# Patient Record
Sex: MALE | Race: WHITE | HISPANIC OR LATINO | Employment: FULL TIME | URBAN - METROPOLITAN AREA
[De-identification: names, ages, dates, MRNs, and addresses within clinical notes are randomized per-mention and may not be internally consistent; named-entity substitution may affect disease eponyms.]

---

## 2018-11-15 DIAGNOSIS — R01.0 BENIGN AND INNOCENT CARDIAC MURMURS: ICD-10-CM

## 2018-11-15 DIAGNOSIS — E04.8 OTHER SPECIFIED NONTOXIC GOITER: Primary | ICD-10-CM

## 2019-03-11 ENCOUNTER — HOSPITAL ENCOUNTER (EMERGENCY)
Facility: HOSPITAL | Age: 19
Discharge: HOME | End: 2019-03-11
Attending: EMERGENCY MEDICINE
Payer: COMMERCIAL

## 2019-03-11 VITALS
HEART RATE: 75 BPM | RESPIRATION RATE: 16 BRPM | DIASTOLIC BLOOD PRESSURE: 69 MMHG | OXYGEN SATURATION: 99 % | TEMPERATURE: 98 F | HEIGHT: 69 IN | SYSTOLIC BLOOD PRESSURE: 123 MMHG | BODY MASS INDEX: 32.58 KG/M2 | WEIGHT: 220 LBS

## 2019-03-11 DIAGNOSIS — R59.1 LYMPHADENOPATHY: ICD-10-CM

## 2019-03-11 DIAGNOSIS — R52 BODY ACHES: Primary | ICD-10-CM

## 2019-03-11 DIAGNOSIS — R20.2 PARESTHESIAS: ICD-10-CM

## 2019-03-11 DIAGNOSIS — M79.10 MYALGIA: ICD-10-CM

## 2019-03-11 LAB
ALBUMIN SERPL BCP-MCNC: 4.6 G/DL
ALP SERPL-CCNC: 59 U/L
ALT SERPL W/O P-5'-P-CCNC: 31 U/L
ANION GAP SERPL CALC-SCNC: 9 MMOL/L
AST SERPL-CCNC: 24 U/L
BASOPHILS # BLD AUTO: 0.04 K/UL
BASOPHILS NFR BLD: 0.5 %
BILIRUB SERPL-MCNC: 0.8 MG/DL
BILIRUB UR QL STRIP: NEGATIVE
BUN SERPL-MCNC: 11 MG/DL
CALCIUM SERPL-MCNC: 9.9 MG/DL
CHLORIDE SERPL-SCNC: 102 MMOL/L
CLARITY UR REFRACT.AUTO: CLEAR
CO2 SERPL-SCNC: 26 MMOL/L
COLOR UR AUTO: NORMAL
CREAT SERPL-MCNC: 0.8 MG/DL
DIFFERENTIAL METHOD: NORMAL
EOSINOPHIL # BLD AUTO: 0.1 K/UL
EOSINOPHIL NFR BLD: 1 %
ERYTHROCYTE [DISTWIDTH] IN BLOOD BY AUTOMATED COUNT: 12.3 %
EST. GFR  (AFRICAN AMERICAN): >60 ML/MIN/1.73 M^2
EST. GFR  (NON AFRICAN AMERICAN): >60 ML/MIN/1.73 M^2
GLUCOSE SERPL-MCNC: 94 MG/DL
GLUCOSE UR QL STRIP: NEGATIVE
HCT VFR BLD AUTO: 45.6 %
HETEROPH AB SERPL QL IA: NEGATIVE
HGB BLD-MCNC: 15.3 G/DL
HGB UR QL STRIP: NEGATIVE
IMM GRANULOCYTES # BLD AUTO: 0.02 K/UL
IMM GRANULOCYTES NFR BLD AUTO: 0.2 %
KETONES UR QL STRIP: NEGATIVE
LEUKOCYTE ESTERASE UR QL STRIP: NEGATIVE
LYMPHOCYTES # BLD AUTO: 2.8 K/UL
LYMPHOCYTES NFR BLD: 32.1 %
MCH RBC QN AUTO: 29.1 PG
MCHC RBC AUTO-ENTMCNC: 33.6 G/DL
MCV RBC AUTO: 87 FL
MONOCYTES # BLD AUTO: 0.5 K/UL
MONOCYTES NFR BLD: 6.1 %
NEUTROPHILS # BLD AUTO: 5.2 K/UL
NEUTROPHILS NFR BLD: 60.1 %
NITRITE UR QL STRIP: NEGATIVE
NRBC BLD-RTO: 0 /100 WBC
PH UR STRIP: 7 [PH] (ref 5–8)
PLATELET # BLD AUTO: 257 K/UL
PMV BLD AUTO: 10.7 FL
POCT GLUCOSE: 92 MG/DL (ref 70–110)
POTASSIUM SERPL-SCNC: 3.7 MMOL/L
PROT SERPL-MCNC: 7.7 G/DL
PROT UR QL STRIP: NEGATIVE
RBC # BLD AUTO: 5.25 M/UL
SODIUM SERPL-SCNC: 137 MMOL/L
SP GR UR STRIP: 1.01 (ref 1–1.03)
URN SPEC COLLECT METH UR: NORMAL
WBC # BLD AUTO: 8.69 K/UL

## 2019-03-11 PROCEDURE — 99284 EMERGENCY DEPT VISIT MOD MDM: CPT | Mod: ,,, | Performed by: PHYSICIAN ASSISTANT

## 2019-03-11 PROCEDURE — 86308 HETEROPHILE ANTIBODY SCREEN: CPT

## 2019-03-11 PROCEDURE — 80053 COMPREHEN METABOLIC PANEL: CPT

## 2019-03-11 PROCEDURE — 85025 COMPLETE CBC W/AUTO DIFF WBC: CPT

## 2019-03-11 PROCEDURE — 99284 PR EMERGENCY DEPT VISIT,LEVEL IV: ICD-10-PCS | Mod: ,,, | Performed by: PHYSICIAN ASSISTANT

## 2019-03-11 PROCEDURE — 87491 CHLMYD TRACH DNA AMP PROBE: CPT

## 2019-03-11 PROCEDURE — 81003 URINALYSIS AUTO W/O SCOPE: CPT

## 2019-03-11 PROCEDURE — 82962 GLUCOSE BLOOD TEST: CPT

## 2019-03-11 PROCEDURE — 99283 EMERGENCY DEPT VISIT LOW MDM: CPT

## 2019-03-12 LAB
C TRACH DNA SPEC QL NAA+PROBE: NOT DETECTED
N GONORRHOEA DNA SPEC QL NAA+PROBE: NOT DETECTED

## 2019-03-12 NOTE — ED TRIAGE NOTES
Patient reports to ED today with report of tingling and upper and lower extremities. Onset 2 days ago.

## 2019-03-12 NOTE — ED PROVIDER NOTES
"Encounter Date: 3/11/2019       History     Chief Complaint   Patient presents with    Generalized Body Aches     C/o intermittent generalized body "pulsing" x 2-3 days.      Patient is a 18-year-old male with no significant medical history who presents to the emergency department with tingling sensations and body aches.  Patient states he has been very sick over the last 2 months.  He reports persistent cold-like symptoms.  He reports sore throat that has been persistent over the last 2 months.  He states he has been to urgent care twice and told he did not have strep throat.  He states he was given 2 steroid shots.  He states for a couple of days he will feel good, but then he goes back to not feeling well. He reports sore throat currently.  He reports intermittent fevers.  He reports body aches.  He reports tingling in his veins.  He denies nausea or vomiting. He denies decrease in appetite.  He denies headache or neck stiffness.  He denies visual disturbance.  He reports normal bowel movements with no blood in stool.  He denies urinary symptoms.  He states he has been having generalized weakness and fatigue.  He also reports swelling behind his right ear.        The history is provided by the patient.     Review of patient's allergies indicates:  No Known Allergies  History reviewed. No pertinent past medical history.  History reviewed. No pertinent surgical history.  History reviewed. No pertinent family history.  Social History     Tobacco Use    Smoking status: Current Every Day Smoker    Smokeless tobacco: Never Used   Substance Use Topics    Alcohol use: No     Frequency: Never    Drug use: No     Review of Systems   Constitutional: Positive for activity change, fatigue and fever. Negative for appetite change and chills.   HENT: Positive for congestion and sore throat. Negative for ear discharge, ear pain, rhinorrhea, trouble swallowing and voice change.    Eyes: Negative for photophobia and visual " disturbance.   Respiratory: Negative for cough and shortness of breath.    Cardiovascular: Negative for chest pain.   Gastrointestinal: Negative for abdominal pain, blood in stool, constipation, diarrhea, nausea and vomiting.   Genitourinary: Negative for dysuria, flank pain and hematuria.   Musculoskeletal: Positive for myalgias. Negative for back pain, neck pain and neck stiffness.   Skin: Negative for rash and wound.   Neurological: Positive for weakness (generalized). Negative for dizziness, syncope, light-headedness, numbness and headaches.       Physical Exam     Initial Vitals [03/11/19 1910]   BP Pulse Resp Temp SpO2   123/69 75 16 98.1 °F (36.7 °C) 99 %      MAP       --         Physical Exam    Nursing note and vitals reviewed.  Constitutional: Vital signs are normal. He appears well-developed and well-nourished. He is not diaphoretic.  Non-toxic appearance. No distress.   HENT:   Head: Normocephalic.   Right Ear: Hearing, tympanic membrane, external ear and ear canal normal.   Left Ear: Hearing, tympanic membrane, external ear and ear canal normal.   Nose: Mucosal edema present.   Mouth/Throat: Uvula is midline, oropharynx is clear and moist and mucous membranes are normal. No trismus in the jaw. No uvula swelling. No oropharyngeal exudate.   Posterior oropharyngeal cobblestoning   Eyes: Conjunctivae and EOM are normal. Pupils are equal, round, and reactive to light.   Neck: Normal range of motion and full passive range of motion without pain. Neck supple. Normal range of motion present. No neck rigidity.   Cardiovascular: Normal rate, regular rhythm and normal heart sounds. Exam reveals no gallop and no friction rub.    No murmur heard.  No lower extremity edema   Pulmonary/Chest: Breath sounds normal. No respiratory distress. He has no wheezes. He has no rhonchi. He has no rales. He exhibits no tenderness.   Abdominal: Soft. Bowel sounds are normal. There is no tenderness.   Lymphadenopathy:        Head  (right side): Posterior auricular adenopathy present.     He has no cervical adenopathy.   Neurological: He is alert and oriented to person, place, and time. He has normal strength. No cranial nerve deficit or sensory deficit. He displays a negative Romberg sign. Coordination and gait normal. GCS score is 15. GCS eye subscore is 4. GCS verbal subscore is 5. GCS motor subscore is 6.   Normal finger to nose.  Normal rapid hand movements.  Normal heel to shin.  Normal gait.   Skin: Skin is warm, dry and intact. Capillary refill takes less than 2 seconds. No bruising, no ecchymosis and no lesion noted. No cyanosis or erythema. No pallor.   No rashes or wounds.   Psychiatric: He has a normal mood and affect.         ED Course   Procedures  Labs Reviewed   C. TRACHOMATIS/N. GONORRHOEAE BY AMP DNA   CBC W/ AUTO DIFFERENTIAL   COMPREHENSIVE METABOLIC PANEL   URINALYSIS, REFLEX TO URINE CULTURE    Narrative:     Preferred Collection Type->Urine, Clean Catch   HETEROPHILE AB SCREEN   POCT GLUCOSE   POCT GLUCOSE MONITORING CONTINUOUS          Imaging Results    None          Medical Decision Making:   Initial Assessment:   Urgent evaluation of a 18-year-old male with no significant medical history who presents to the emergency department with generalized body aches and tingling sensations.  Patient is afebrile, nontoxic appearing, and hemodynamically stable. Patient is very well appearing exam.  Vital signs are pristine.  No nuchal rigidity.  Unremarkable extensive neuro exam.  Benign abdominal exam.  No lower extremity edema. Pink conjunctiva.  Moist mucous membranes.  There is posterior oropharyngeal cobblestoning with some nasal mucosal edema.  Uvula is midline.  No exudate. No evidence of strep pharyngitis.  Patient's mother accompanies him.  She states she is very worried that he has been so sick over the last 2 months.  I will obtain basic labs to evaluate white count and H&H.  Will evaluate electrolytes and kidney  function. They are requesting STD testing, so I will add this on to his urine.  Explained that if they want further HIV, syphilis, and other blood work they would need to follow up with PCP.  With patient's persistent sore throat, I will also check a Monospot.  Will check glucose.  Explained to patient and mother they will need to follow up with PCP for further vitamin deficiency testing.  Also explained if symptoms persist they should follow up with Neurology.  On exam, patient does have right-sided postauricular lymphadenopathy.  Advised to follow up with PCP about this if persistent for possible outpatient biopsy.  Clinical Tests:   Lab Tests: Ordered and Reviewed  ED Management:  8:48 PM  Lab work is unremarkable.  No anemia.  No leukocytosis.  No neutropenia.  No kidney insufficiency.  No electrolyte disturbance.  Urinalysis is unremarkable.  Glucose is normal. Monospot is negative. I do not feel that further emergent workup is warranted.  Patient is advised to follow up with PCP for further outpatient workup.  Patient is advised to return to the emergency department with any worsening symptoms or concerns.                      Clinical Impression:       ICD-10-CM ICD-9-CM   1. Body aches R52 780.96   2. Paresthesias R20.2 782.0   3. Lymphadenopathy R59.1 785.6   4. Myalgia M79.10 729.1                                Masha Johnson PA-C  03/11/19 2050

## 2019-06-24 ENCOUNTER — HOSPITAL ENCOUNTER (OUTPATIENT)
Dept: RADIOLOGY | Facility: HOSPITAL | Age: 19
Discharge: HOME | End: 2019-06-24
Attending: PEDIATRICS
Payer: COMMERCIAL

## 2019-06-24 DIAGNOSIS — R05.9 COUGH: ICD-10-CM

## 2019-06-24 DIAGNOSIS — R05.9 COUGH: Primary | ICD-10-CM

## 2019-06-24 PROCEDURE — 71046 X-RAY EXAM CHEST 2 VIEWS: CPT | Mod: TC,FY

## 2019-06-24 PROCEDURE — 71046 X-RAY EXAM CHEST 2 VIEWS: CPT | Mod: 26,,, | Performed by: RADIOLOGY

## 2019-06-24 PROCEDURE — 71046 XR CHEST PA AND LATERAL: ICD-10-PCS | Mod: 26,,, | Performed by: RADIOLOGY

## 2019-06-26 ENCOUNTER — HOSPITAL ENCOUNTER (OUTPATIENT)
Dept: RADIOLOGY | Facility: HOSPITAL | Age: 19
Discharge: HOME | End: 2019-06-26
Attending: PEDIATRICS
Payer: COMMERCIAL

## 2019-06-26 DIAGNOSIS — M54.50 LOW BACKACHE: Primary | ICD-10-CM

## 2019-06-26 DIAGNOSIS — M54.50 LOW BACKACHE: ICD-10-CM

## 2019-06-26 PROCEDURE — 72080 X-RAY EXAM THORACOLMB 2/> VW: CPT | Mod: TC

## 2019-06-26 PROCEDURE — 72080 X-RAY EXAM THORACOLMB 2/> VW: CPT | Mod: 26,,, | Performed by: RADIOLOGY

## 2019-06-26 PROCEDURE — 72080 XR THORACOLUMBAR SPINE AP LATERAL: ICD-10-PCS | Mod: 26,,, | Performed by: RADIOLOGY

## 2020-02-25 ENCOUNTER — HOSPITAL ENCOUNTER (EMERGENCY)
Facility: HOSPITAL | Age: 20
Discharge: HOME | End: 2020-02-25
Attending: EMERGENCY MEDICINE
Payer: COMMERCIAL

## 2020-02-25 VITALS
OXYGEN SATURATION: 97 % | HEART RATE: 99 BPM | SYSTOLIC BLOOD PRESSURE: 125 MMHG | DIASTOLIC BLOOD PRESSURE: 72 MMHG | TEMPERATURE: 99 F | RESPIRATION RATE: 19 BRPM | BODY MASS INDEX: 29.35 KG/M2 | HEIGHT: 70 IN | WEIGHT: 205 LBS

## 2020-02-25 DIAGNOSIS — S61.011A LACERATION OF RIGHT THUMB WITHOUT FOREIGN BODY WITHOUT DAMAGE TO NAIL, INITIAL ENCOUNTER: Primary | ICD-10-CM

## 2020-02-25 PROCEDURE — 90471 IMMUNIZATION ADMIN: CPT | Performed by: PHYSICIAN ASSISTANT

## 2020-02-25 PROCEDURE — 25000003 PHARM REV CODE 250: Performed by: PHYSICIAN ASSISTANT

## 2020-02-25 PROCEDURE — 99284 EMERGENCY DEPT VISIT MOD MDM: CPT | Mod: 25

## 2020-02-25 PROCEDURE — 12001 RPR S/N/AX/GEN/TRNK 2.5CM/<: CPT | Mod: RT

## 2020-02-25 PROCEDURE — 63600175 PHARM REV CODE 636 W HCPCS: Performed by: PHYSICIAN ASSISTANT

## 2020-02-25 PROCEDURE — 90715 TDAP VACCINE 7 YRS/> IM: CPT | Performed by: PHYSICIAN ASSISTANT

## 2020-02-25 RX ORDER — LIDOCAINE HYDROCHLORIDE 10 MG/ML
10 INJECTION INFILTRATION; PERINEURAL
Status: COMPLETED | OUTPATIENT
Start: 2020-02-25 | End: 2020-02-25

## 2020-02-25 RX ADMIN — NEOMYCIN-BACITRACIN-POLYMYXIN OINT 1 EACH: OINTMENT at 07:02

## 2020-02-25 RX ADMIN — CLOSTRIDIUM TETANI TOXOID ANTIGEN (FORMALDEHYDE INACTIVATED), CORYNEBACTERIUM DIPHTHERIAE TOXOID ANTIGEN (FORMALDEHYDE INACTIVATED), BORDETELLA PERTUSSIS TOXOID ANTIGEN (GLUTARALDEHYDE INACTIVATED), BORDETELLA PERTUSSIS FILAMENTOUS HEMAGGLUTININ ANTIGEN (FORMALDEHYDE INACTIVATED), BORDETELLA PERTUSSIS PERTACTIN ANTIGEN, AND BORDETELLA PERTUSSIS FIMBRIAE 2/3 ANTIGEN 0.5 ML: 5; 2; 2.5; 5; 3; 5 INJECTION, SUSPENSION INTRAMUSCULAR at 07:02

## 2020-02-25 RX ADMIN — LIDOCAINE HYDROCHLORIDE 10 ML: 10 INJECTION, SOLUTION INFILTRATION; PERINEURAL at 07:02

## 2020-02-25 NOTE — ED NOTES
Patient reports drinking a 5th of whiskey and 1 case of beer himself.  Patient A&O x 4 with GCS 15.  Irrigation materials and laceration kit at bedside.

## 2020-02-25 NOTE — ED PROVIDER NOTES
Encounter Date: 2/25/2020       History     Chief Complaint   Patient presents with    Laceration     pt reports that he tripped, fell, and cut his RIGHT hand on the glass beer bottle he was holding; pt has aprox 3cm long laceration to medial side of thumb of RIGHT hand (not actively bleeding); pt's Tetanus UTD     Chief Complaint:  Laceration  History of  Present Illness: History obtained from patient. This 19 y.o. male who has no known past medical history presents to the ED complaining of lacerations to the right thumb after falling onto a beer bottle at 5:00 a.m. this morning.  Denies numbness, tingling, weakness. Patient is unsure of last tetanus vaccination.        Review of patient's allergies indicates:  No Known Allergies  History reviewed. No pertinent past medical history.  History reviewed. No pertinent surgical history.  History reviewed. No pertinent family history.  Social History     Tobacco Use    Smoking status: Current Every Day Smoker     Types: Cigarettes    Smokeless tobacco: Never Used   Substance Use Topics    Alcohol use: Yes     Frequency: Never     Comment: Occasionally     Drug use: No     Review of Systems   Constitutional: Negative for chills and fever.   HENT: Negative for congestion, rhinorrhea and sore throat.    Eyes: Negative for visual disturbance.   Respiratory: Negative for cough and shortness of breath.    Cardiovascular: Negative for chest pain.   Gastrointestinal: Negative for abdominal pain, diarrhea, nausea and vomiting.   Genitourinary: Negative for dysuria, frequency and hematuria.   Musculoskeletal: Negative for back pain.   Skin: Positive for wound. Negative for rash.   Neurological: Negative for dizziness, weakness and headaches.       Physical Exam     Initial Vitals [02/25/20 0534]   BP Pulse Resp Temp SpO2   125/72 99 19 99 °F (37.2 °C) 97 %      MAP       --         Physical Exam    Nursing note and vitals reviewed.  Constitutional: He appears well-developed  and well-nourished. No distress.   HENT:   Head: Normocephalic and atraumatic.   Right Ear: Tympanic membrane normal.   Left Ear: Tympanic membrane normal.   Nose: Nose normal.   Mouth/Throat: Uvula is midline, oropharynx is clear and moist and mucous membranes are normal.   Eyes: EOM are normal. Pupils are equal, round, and reactive to light.   Neck: Trachea normal, normal range of motion, full passive range of motion without pain and phonation normal. Neck supple. No stridor present. No spinous process tenderness and no muscular tenderness present. Normal range of motion present. No neck rigidity.   Cardiovascular: Normal rate, regular rhythm and normal heart sounds. Exam reveals no gallop and no friction rub.    No murmur heard.  Pulses:       Radial pulses are 2+ on the right side, and 2+ on the left side.   Pulmonary/Chest: Effort normal and breath sounds normal. No respiratory distress. He has no wheezes. He has no rhonchi. He has no rales.   Abdominal: Soft. Bowel sounds are normal. He exhibits no mass. There is no tenderness. There is no rebound and no guarding.   Musculoskeletal: Normal range of motion.   There is a 1.5 cm laceration to the medial aspect of the right thumb.  There is full range of motion of the right thumb against resistance.  No active bleeding.  No obvious tendon lacerations.  No visible foreign bodies.   Neurological: He is alert and oriented to person, place, and time. He has normal strength. No cranial nerve deficit or sensory deficit.   Skin: Skin is warm and dry. Capillary refill takes less than 2 seconds.   Psychiatric: He has a normal mood and affect.         ED Course   Lac Repair  Date/Time: 2/25/2020 8:30 AM  Performed by: Jonathan Esteves PA-C  Authorized by: Ronald Del Toro MD   Body area: upper extremity  Location details: right thumb  Laceration length: 1.5 cm  Foreign bodies: no foreign bodies  Tendon involvement: none  Nerve involvement: none  Vascular damage:  no  Anesthesia: digital block    Anesthesia:  Local Anesthetic: lidocaine 1% without epinephrine  Anesthetic total: 2 mL  Preparation: Patient was prepped and draped in the usual sterile fashion.  Irrigation solution: saline  Irrigation method: syringe  Amount of cleaning: extensive  Debridement: none  Degree of undermining: none  Skin closure: 5-0 nylon  Number of sutures: 6  Technique: simple  Approximation: close  Approximation difficulty: simple  Dressing: 4x4 sterile gauze and antibiotic ointment  Patient tolerance: Patient tolerated the procedure well with no immediate complications        Labs Reviewed - No data to display       Imaging Results          X-Ray Finger 2 or More Views Right (Final result)  Result time 02/25/20 07:38:09    Final result by Toy Mercer DO (02/25/20 07:38:09)                 Impression:      Please see above      Electronically signed by: Toy Mercer DO  Date:    02/25/2020  Time:    07:38             Narrative:    EXAMINATION:  XR FINGER 2 OR MORE VIEWS RIGHT    CLINICAL HISTORY:  laceration, evaluate for foreign body;    TECHNIQUE:  AP, lateral and oblique views 1st digit right hand    COMPARISON:  None    FINDINGS:  Question soft tissue swelling overlying the 1st proximal phalanx with heterogeneous lucency concerning for possible subcutaneous emphysema concerning for laceration in light of history.  There is no evidence for acute fracture line or dislocation first digit right hand.  No focal bony erosion.  Further evaluation as warranted clinically.                                 Medical Decision Making:   ED Management:  This is an evaluation of a 19 y.o. male that presents to the Emergency Department for a Laceration. Physical Exam shows a non-toxic, afebrile, and well appearing male. There is a laceration to medial aspect of the right thumb. There is no surrounding erythema or area of increased warmth. All compartments are soft. There is full ROM of the right thumb  against resistance. Equal sensation to the extremity. No visible tendon lacerations observed on exam.  The wound was irrigated and visually inspected prior to closure. No visible foreign bodies noted. Vital Signs Are Reassuring. Tetanus is not up to date.     RESULTS: Teteanus updated. The wound was closed per the procedure note.     My overall impression is Laceration. I considered, but at this time, do not suspect cellulitis, compartment syndrome, underlying fracture, tendon injury, or suspect any retained foreign body at this time.     Additional D/C Information: Laceration/Wound Care/Suture Removal instructions given. The diagnosis, treatment plan, instructions for follow-up and reevaluation with his PCP or Return to ED for suture removal as well as ED return precautions were discussed and understanding was verbalized. All questions or concerns have been addressed.                                    Clinical Impression:       ICD-10-CM ICD-9-CM   1. Laceration of right thumb without foreign body without damage to nail, initial encounter S61.011A 883.0                             Jonathan Esteves PA-C  02/25/20 0831

## 2020-02-25 NOTE — DISCHARGE INSTRUCTIONS
Return to the ER go to your primary care doctor in 10-14 days for suture removal  You may bathe with the sutures but do not scrub vigorously.  Change dressing daily  Return to the ER for new or worsening symptoms.

## 2020-07-16 ENCOUNTER — OFFICE VISIT (OUTPATIENT)
Dept: URGENT CARE | Facility: CLINIC | Age: 20
End: 2020-07-16
Payer: COMMERCIAL

## 2020-07-16 VITALS
HEART RATE: 68 BPM | TEMPERATURE: 98 F | SYSTOLIC BLOOD PRESSURE: 127 MMHG | DIASTOLIC BLOOD PRESSURE: 75 MMHG | OXYGEN SATURATION: 98 %

## 2020-07-16 DIAGNOSIS — J32.9 SINUSITIS, UNSPECIFIED CHRONICITY, UNSPECIFIED LOCATION: Primary | ICD-10-CM

## 2020-07-16 DIAGNOSIS — J02.9 SORE THROAT: ICD-10-CM

## 2020-07-16 LAB
CTP QC/QA: YES
MOLECULAR STREP A: NEGATIVE

## 2020-07-16 PROCEDURE — 99203 PR OFFICE/OUTPT VISIT, NEW, LEVL III, 30-44 MIN: ICD-10-PCS | Mod: S$GLB,,, | Performed by: PHYSICIAN ASSISTANT

## 2020-07-16 PROCEDURE — 87651 STREP A DNA AMP PROBE: CPT | Mod: QW,S$GLB,, | Performed by: PHYSICIAN ASSISTANT

## 2020-07-16 PROCEDURE — 87651 POCT STREP A MOLECULAR: ICD-10-PCS | Mod: QW,S$GLB,, | Performed by: PHYSICIAN ASSISTANT

## 2020-07-16 PROCEDURE — 99203 OFFICE O/P NEW LOW 30 MIN: CPT | Mod: S$GLB,,, | Performed by: PHYSICIAN ASSISTANT

## 2020-07-16 PROCEDURE — U0003 INFECTIOUS AGENT DETECTION BY NUCLEIC ACID (DNA OR RNA); SEVERE ACUTE RESPIRATORY SYNDROME CORONAVIRUS 2 (SARS-COV-2) (CORONAVIRUS DISEASE [COVID-19]), AMPLIFIED PROBE TECHNIQUE, MAKING USE OF HIGH THROUGHPUT TECHNOLOGIES AS DESCRIBED BY CMS-2020-01-R: HCPCS

## 2020-07-16 RX ORDER — FLUTICASONE PROPIONATE 50 MCG
1 SPRAY, SUSPENSION (ML) NASAL DAILY
Qty: 16 G | Refills: 0 | Status: SHIPPED | OUTPATIENT
Start: 2020-07-16 | End: 2022-08-18

## 2020-07-16 NOTE — LETTER
[address] [address] [facility] [address], [address] ? [place] [address] ? Phone [phone] ? Fax [phone]           Return to Work/School    Patient: Juan Healy  YOB: 2000   Date: 07/16/2020      To Whom It May Concern:     Juan Healy was in contact with/seen in my office on 07/16/2020. COVID-19 is present in our communities across the state. Not all patients are eligible or appropriate to be tested. In this situation, your employee meets the following criteria:     Juan Healy has met the criteria for COVID-19 testing based upon symptoms, travel, and/or potential exposure. The test has been completed and is pending results at this time. During this time the employee is not able to work and should be quarantined per the Centers for Disease Control timelines.      If you have any questions or concerns, or if I can be of further assistance, please do not hesitate to contact me.     Sincerely,      Ana Cornejo PA-C

## 2020-07-16 NOTE — PROGRESS NOTES
Subjective:       Patient ID: Juan Healy is a 19 y.o. male.    Vitals:  temperature is 97.9 °F (36.6 °C). His blood pressure is 127/75 and his pulse is 68. His oxygen saturation is 98%.     Chief Complaint: URI    Patient presents with a 1 week history of sore throat, mild intermittent dry cough, sinus congestion and fatigue. Denies chest pain, SOB, GI upset, abdominal pain, myalgias/arthralgias, or fever. Patient denies seasonal allergies. States that he has tried Mucinex and chloraseptic spray with mild relief.     URI   This is a new problem. The current episode started 1 to 4 weeks ago. Associated symptoms include congestion, coughing and headaches. Pertinent negatives include no ear pain, nausea, rash, sinus pain, sore throat, vomiting or wheezing. He has tried decongestant for the symptoms. The treatment provided no relief.       Constitution: Negative for chills, sweating, fatigue and fever.   HENT: Positive for congestion. Negative for ear pain, sinus pain, sinus pressure, sore throat and voice change.    Neck: Negative for painful lymph nodes.   Eyes: Negative for eye redness.   Respiratory: Positive for cough. Negative for chest tightness, sputum production, bloody sputum, COPD, shortness of breath, stridor, wheezing and asthma.    Gastrointestinal: Negative for nausea and vomiting.   Musculoskeletal: Negative for muscle ache.   Skin: Negative for rash.   Allergic/Immunologic: Negative for seasonal allergies and asthma.   Neurological: Positive for headaches.   Hematologic/Lymphatic: Negative for swollen lymph nodes.       Objective:      Physical Exam   Constitutional: He is oriented to person, place, and time. He appears well-developed. He is cooperative.  Non-toxic appearance. He does not appear ill. No distress.   HENT:   Head: Normocephalic and atraumatic.   Ears:   Right Ear: Hearing, tympanic membrane, external ear and ear canal normal. Tympanic membrane is not injected, not scarred, not  perforated, not erythematous and not bulging. No middle ear effusion.   Left Ear: Hearing, tympanic membrane, external ear and ear canal normal. Tympanic membrane is not injected, not scarred, not perforated, not erythematous and not bulging.  No middle ear effusion.   Nose: Nose normal. No mucosal edema, rhinorrhea, nasal deformity or congestion. No epistaxis. Right sinus exhibits no maxillary sinus tenderness and no frontal sinus tenderness. Left sinus exhibits no maxillary sinus tenderness and no frontal sinus tenderness.   Mouth/Throat: Uvula is midline and mucous membranes are normal. No trismus in the jaw. Normal dentition. No uvula swelling. Posterior oropharyngeal erythema (mild) and cobblestoning present. No oropharyngeal exudate, posterior oropharyngeal edema or tonsillar abscesses. Tonsils are 1+ on the right. Tonsils are 1+ on the left. No tonsillar exudate.   Eyes: Conjunctivae and lids are normal. No scleral icterus.   Neck: Trachea normal, full passive range of motion without pain and phonation normal. Neck supple. No neck rigidity. No edema and no erythema present.   Cardiovascular: Normal rate, regular rhythm, normal heart sounds and normal pulses. Exam reveals no gallop and no friction rub.   No murmur heard.  Pulmonary/Chest: Effort normal and breath sounds normal. No stridor. No respiratory distress. He has no decreased breath sounds. He has no wheezes. He has no rhonchi. He has no rales.   Abdominal: Normal appearance.   Musculoskeletal: Normal range of motion.         General: No deformity.   Lymphadenopathy:        Head (right side): No submandibular and no tonsillar adenopathy present.        Head (left side): No submandibular and no tonsillar adenopathy present.     He has no cervical adenopathy.        Right cervical: No superficial cervical and no posterior cervical adenopathy present.       Left cervical: No superficial cervical and no posterior cervical adenopathy present.    Neurological: He is alert and oriented to person, place, and time. He exhibits normal muscle tone. Coordination normal.   Skin: Skin is warm, dry, intact, not diaphoretic and not pale. Psychiatric: His speech is normal and behavior is normal. Judgment and thought content normal.   Nursing note and vitals reviewed.        Results for orders placed or performed in visit on 07/16/20   POCT Strep A, Molecular   Result Value Ref Range    Molecular Strep A, POC Negative Negative     Acceptable Yes        Assessment:       1. Sinusitis, unspecified chronicity, unspecified location    2. Sore throat        Plan:     Strep negative at time of visit and discussed results with patient. Covid testing done today. Flonase prescribed for resolution of sinusitis and post-nasal drip. Magic mouth wash called in for analgesic relief of pharyngitis.    Discussed with patient that I cannot definitively rule out COVID-19 at this time. Discussed that the patient does meet criteria for outpatient testing per CDC/[facility]'s current guidelines. Advised if patient developed new symptoms to return for re-evaluation. Advised if the patient developed difficulty breathing or shortness of breath, go to the ED immediately. Stressed importance of good hand hygiene and social distancing. Patient voiced understanding.     Rest and fluids are important.  Please take tylenol for help with fever, pain.  Mucinex can help with chest congestion.  Tessalon perrles can be used as directed as needed to help with cough.  Albuterol inhaler can be used as directed needed for wheezing as we have discussed.    If you develop worsening symptoms, especially shortness of breath or difficulty breathing, please go to the ED for further evaluation.      Sinusitis, unspecified chronicity, unspecified location  -     fluticasone propionate (FLONASE) 50 mcg/actuation nasal spray; 1 spray (50 mcg total) by Each Nostril route once daily.  Dispense: 16 g;  Refill: 0    Sore throat  -     POCT Strep A, Molecular  -     (Magic mouthwash) 1:1:1 Benadryl 12.5mg/5ml liq, aluminum & magnesium hydroxide-simehticone (Maalox), lidocaine viscous 2%; Swish and spit 10 mLs every 4 (four) hours as needed. for sore throat  Dispense: 360 mL; Refill: 0      Patient Instructions     Sinusitis (No Antibiotics)    The sinuses are air-filled spaces within the bones of the face. They connect to the inside of the nose. Sinusitis is an inflammation of the tissue lining the sinus cavity. Sinus inflammation can occur during a cold. It can also be due to allergies to pollens and other particles in the air. It can cause symptoms such as sinus congestion, headache, sore throat, facial swelling and fullness. It may also cause a low-grade fever. No infection is present, and no antibiotic treatment is needed.  Home care  · Drink plenty of water, hot tea, and other liquids. This may help thin mucus. It also may promote sinus drainage.  · Heat may help soothe painful areas of the face. Use a towel soaked in hot water. Or,  the shower and direct the hot spray onto your face. Using a vaporizer along with a menthol rub at night may also help.   · An expectorant containing guaifenesin may help thin the mucus and promote drainage from the sinuses.  · Over-the-counter decongestants may be used unless a similar medicine was prescribed. Nasal sprays work the fastest. Use one that contains phenylephrine or oxymetazoline. First blow the nose gently. Then use the spray. Do not use these medicines more often than directed on the label or symptoms may get worse. You may also use tablets containing pseudoephedrine. Avoid products that combine ingredients, because side effects may be increased. Read labels. You can also ask the pharmacist for help. (NOTE: Persons with high blood pressure should not use decongestants. They can raise blood pressure.)  · Over-the-counter antihistamines may help if allergies  contributed to your sinusitis.    · Use acetaminophen or ibuprofen to control pain, unless another pain medicine was prescribed. (If you have chronic liver or kidney disease or ever had a stomach ulcer, talk with your doctor before using these medicines. Aspirin should never be used in anyone under 18 years of age who is ill with a fever. It may cause severe liver damage.)  · Use nasal rinses or irrigation as instructed by your health care provider.  · Don't smoke. This can worsen symptoms.  Follow-up care  Follow up with your healthcare provider or our staff if you are not improving within the next week.  When to seek medical advice  Call your healthcare provider if any of these occur:  · Green or yellow discharge from the nose or into the throat  · Facial pain or headache becoming more severe  · Stiff neck  · Unusual drowsiness or confusion  · Swelling of the forehead or eyelids  · Vision problems, including blurred or double vision  · Fever of 100.4ºF (38ºC) or higher, or as directed by your healthcare provider  · Seizure  · Breathing problems  · Symptoms not resolving within 10 days  Date Last Reviewed: 4/13/2015  © 2575-5274 [employer]. [address] [place] [address]. All rights reserved. This information is not intended as a substitute for professional medical care. Always follow your healthcare professional's instructions.      Instructions for Patients with Confirmed or Suspected COVID-19    If you are awaiting your test result, you will either be called or it will be released to the patient portal.  If you have any questions about your test, please visit [email or web address] or call our COVID-19 information line at [phone].      Preventing the Spread of Coronavirus Disease 2019 (COVID-19) in Homes and Residential Communities -- Patients     Prevention steps for people with confirmed or suspected COVID-19 (including persons under investigation) who do not need to be  hospitalized and people with confirmed COVID-19 who were hospitalized and determined to be medically stable to go home.      Stay home except to get medical care.    Separate yourself from other people and animals in your home.    Call ahead before visiting your doctor.    Wear a face mask.    Cover your coughs and sneezes.    Clean your hands often.    Avoid sharing personal household items.    Clean all high-touch surfaces every day.    Monitor your symptoms. Seek prompt medical attention if your illness is worsening (e.g., difficulty breathing). Before seeking care, call your healthcare provider.    If you have a medical emergency and must call 911, notify the dispatcher that you have or are being evaluated for COVID-19. If possible, put on a face mask before emergency medical services arrive.    Use the following symptom-based strategy to return to normal activity following a suspected or confirmed case of COVID-19. Continue isolation until:   o At least 3 days (72 hours) have passed since recovery defined as resolution of fever without the use of fever-reducing medications and improvement in respiratory symptoms (e.g. cough, shortness of breath), and   o At least 10 days have passed since symptoms first appeared.     Precautions for household members, intimate partners and caregivers in a non-healthcare setting of a patient with symptomatic laboratory-confirmed COVID-19 or a patient under investigation.     Household members, intimate partners and caregivers in a non-healthcare setting may have close contact with a person with symptomatic, laboratory-confirmed COVID-19 or a person under investigation. Close contacts should monitor their health; they should call their healthcare provider right away if they develop symptoms suggestive of COVID-19 (e.g., fever, cough, shortness of breath). Close contacts should also follow these recommendations:     · Stay home for the duration of the time recommended  by healthcare provider, except to get medical care. Separate yourself from other people and animals in the home.  · Monitor the patients symptoms. If the patient is getting sicker, call his or her healthcare provider. If the patient has a medical emergency and you need to call 911, notify the dispatch personnel that the patient has or is being evaluated for COVID-19.   · Wear a facemask when around other people such as sharing a room or vehicle and before entering a healthcare provider's office.  · Cover coughs and sneezes with a tissue. Throw used tissues in a lined trash can immediately and wash hands.  · Clean hands often with soap and water for at least 20 seconds or with an alcohol-based hand , rubbing hands together until they feel dry. Avoid touching your eyes, nose, and mouth with unwashed hands.  · Clean all high-touch; surfaces every day, including counters, tabletops, doorknobs, bathroom fixtures, toilets, phones, keyboards, tablets, bedside tables, etc. Use a household cleaning spray or wipe according to label instructions.  · Avoid sharing personal household items such as dishes, drinking glasses, cups, towels, bedding, etc. After these items are used, they should be washed thoroughly with soap and water.  · Use the following symptom-based strategy to return to normal activity following a suspected or confirmed case of COVID-19. Continue isolation until:   · At least 3 days (72 hours) have passed since recovery defined as resolution of fever without the use of fever-reducing medications and improvement in respiratory symptoms (e.g. cough, shortness of breath), and   · At least 10 days have passed since symptoms first appeared.    Please follow up with your Primary care provider within 2-5 days if your signs and symptoms have not resolved or worsen.     If your condition worsens or fails to improve we recommend that you receive another evaluation at the emergency room immediately or contact  your primary medical clinic to discuss your concerns.   You must understand that you have received an Urgent Care treatment only and that you may be released before all of your medical problems are known or treated. You, the patient, will arrange for follow up care as instructed.     RED FLAGS/WARNING SYMPTOMS DISCUSSED WITH PATIENT THAT WOULD WARRANT EMERGENT MEDICAL ATTENTION. PATIENT VERBALIZED UNDERSTANDING.

## 2020-07-16 NOTE — PATIENT INSTRUCTIONS
Sinusitis (No Antibiotics)    The sinuses are air-filled spaces within the bones of the face. They connect to the inside of the nose. Sinusitis is an inflammation of the tissue lining the sinus cavity. Sinus inflammation can occur during a cold. It can also be due to allergies to pollens and other particles in the air. It can cause symptoms such as sinus congestion, headache, sore throat, facial swelling and fullness. It may also cause a low-grade fever. No infection is present, and no antibiotic treatment is needed.  Home care  · Drink plenty of water, hot tea, and other liquids. This may help thin mucus. It also may promote sinus drainage.  · Heat may help soothe painful areas of the face. Use a towel soaked in hot water. Or,  the shower and direct the hot spray onto your face. Using a vaporizer along with a menthol rub at night may also help.   · An expectorant containing guaifenesin may help thin the mucus and promote drainage from the sinuses.  · Over-the-counter decongestants may be used unless a similar medicine was prescribed. Nasal sprays work the fastest. Use one that contains phenylephrine or oxymetazoline. First blow the nose gently. Then use the spray. Do not use these medicines more often than directed on the label or symptoms may get worse. You may also use tablets containing pseudoephedrine. Avoid products that combine ingredients, because side effects may be increased. Read labels. You can also ask the pharmacist for help. (NOTE: Persons with high blood pressure should not use decongestants. They can raise blood pressure.)  · Over-the-counter antihistamines may help if allergies contributed to your sinusitis.    · Use acetaminophen or ibuprofen to control pain, unless another pain medicine was prescribed. (If you have chronic liver or kidney disease or ever had a stomach ulcer, talk with your doctor before using these medicines. Aspirin should never be used in anyone under 18 years of age  who is ill with a fever. It may cause severe liver damage.)  · Use nasal rinses or irrigation as instructed by your health care provider.  · Don't smoke. This can worsen symptoms.  Follow-up care  Follow up with your healthcare provider or our staff if you are not improving within the next week.  When to seek medical advice  Call your healthcare provider if any of these occur:  · Green or yellow discharge from the nose or into the throat  · Facial pain or headache becoming more severe  · Stiff neck  · Unusual drowsiness or confusion  · Swelling of the forehead or eyelids  · Vision problems, including blurred or double vision  · Fever of 100.4ºF (38ºC) or higher, or as directed by your healthcare provider  · Seizure  · Breathing problems  · Symptoms not resolving within 10 days  Date Last Reviewed: 4/13/2015  © 2577-3122 [employer]. [address] [place] [address]. All rights reserved. This information is not intended as a substitute for professional medical care. Always follow your healthcare professional's instructions.      Instructions for Patients with Confirmed or Suspected COVID-19    If you are awaiting your test result, you will either be called or it will be released to the patient portal.  If you have any questions about your test, please visit [email or web address] or call our COVID-19 information line at [phone].      Preventing the Spread of Coronavirus Disease 2019 (COVID-19) in Homes and Residential Communities -- Patients     Prevention steps for people with confirmed or suspected COVID-19 (including persons under investigation) who do not need to be hospitalized and people with confirmed COVID-19 who were hospitalized and determined to be medically stable to go home.      Stay home except to get medical care.    Separate yourself from other people and animals in your home.    Call ahead before visiting your doctor.    Wear a face mask.    Cover your coughs  and sneezes.    Clean your hands often.    Avoid sharing personal household items.    Clean all high-touch surfaces every day.    Monitor your symptoms. Seek prompt medical attention if your illness is worsening (e.g., difficulty breathing). Before seeking care, call your healthcare provider.    If you have a medical emergency and must call 911, notify the dispatcher that you have or are being evaluated for COVID-19. If possible, put on a face mask before emergency medical services arrive.    Use the following symptom-based strategy to return to normal activity following a suspected or confirmed case of COVID-19. Continue isolation until:   o At least 3 days (72 hours) have passed since recovery defined as resolution of fever without the use of fever-reducing medications and improvement in respiratory symptoms (e.g. cough, shortness of breath), and   o At least 10 days have passed since symptoms first appeared.     Precautions for household members, intimate partners and caregivers in a non-healthcare setting of a patient with symptomatic laboratory-confirmed COVID-19 or a patient under investigation.     Household members, intimate partners and caregivers in a non-healthcare setting may have close contact with a person with symptomatic, laboratory-confirmed COVID-19 or a person under investigation. Close contacts should monitor their health; they should call their healthcare provider right away if they develop symptoms suggestive of COVID-19 (e.g., fever, cough, shortness of breath). Close contacts should also follow these recommendations:     · Stay home for the duration of the time recommended by healthcare provider, except to get medical care. Separate yourself from other people and animals in the home.  · Monitor the patients symptoms. If the patient is getting sicker, call his or her healthcare provider. If the patient has a medical emergency and you need to call 911, notify the dispatch personnel  that the patient has or is being evaluated for COVID-19.   · Wear a facemask when around other people such as sharing a room or vehicle and before entering a healthcare provider's office.  · Cover coughs and sneezes with a tissue. Throw used tissues in a lined trash can immediately and wash hands.  · Clean hands often with soap and water for at least 20 seconds or with an alcohol-based hand , rubbing hands together until they feel dry. Avoid touching your eyes, nose, and mouth with unwashed hands.  · Clean all high-touch; surfaces every day, including counters, tabletops, doorknobs, bathroom fixtures, toilets, phones, keyboards, tablets, bedside tables, etc. Use a household cleaning spray or wipe according to label instructions.  · Avoid sharing personal household items such as dishes, drinking glasses, cups, towels, bedding, etc. After these items are used, they should be washed thoroughly with soap and water.  · Use the following symptom-based strategy to return to normal activity following a suspected or confirmed case of COVID-19. Continue isolation until:   · At least 3 days (72 hours) have passed since recovery defined as resolution of fever without the use of fever-reducing medications and improvement in respiratory symptoms (e.g. cough, shortness of breath), and   · At least 10 days have passed since symptoms first appeared.    Please follow up with your Primary care provider within 2-5 days if your signs and symptoms have not resolved or worsen.     If your condition worsens or fails to improve we recommend that you receive another evaluation at the emergency room immediately or contact your primary medical clinic to discuss your concerns.   You must understand that you have received an Urgent Care treatment only and that you may be released before all of your medical problems are known or treated. You, the patient, will arrange for follow up care as instructed.     RED FLAGS/WARNING SYMPTOMS  DISCUSSED WITH PATIENT THAT WOULD WARRANT EMERGENT MEDICAL ATTENTION. PATIENT VERBALIZED UNDERSTANDING.

## 2020-07-18 LAB — SARS-COV-2 RNA RESP QL NAA+PROBE: NOT DETECTED

## 2020-07-19 ENCOUNTER — TELEPHONE (OUTPATIENT)
Dept: URGENT CARE | Facility: CLINIC | Age: 20
End: 2020-07-19

## 2021-04-16 ENCOUNTER — PATIENT MESSAGE (OUTPATIENT)
Dept: RESEARCH | Facility: HOSPITAL | Age: 21
End: 2021-04-16

## 2021-08-24 ENCOUNTER — HOSPITAL ENCOUNTER (EMERGENCY)
Facility: HOSPITAL | Age: 21
Discharge: HOME | End: 2021-08-24
Attending: EMERGENCY MEDICINE
Payer: OTHER GOVERNMENT

## 2021-08-24 VITALS
HEIGHT: 69 IN | DIASTOLIC BLOOD PRESSURE: 93 MMHG | WEIGHT: 260 LBS | RESPIRATION RATE: 18 BRPM | SYSTOLIC BLOOD PRESSURE: 171 MMHG | OXYGEN SATURATION: 98 % | HEART RATE: 85 BPM | BODY MASS INDEX: 38.51 KG/M2 | TEMPERATURE: 100 F

## 2021-08-24 DIAGNOSIS — J06.9 VIRAL URI: Primary | ICD-10-CM

## 2021-08-24 DIAGNOSIS — R09.81 NASAL CONGESTION: ICD-10-CM

## 2021-08-24 LAB
CTP QC/QA: YES
POC RAPID STREP A: NEGATIVE
SARS-COV-2 RDRP RESP QL NAA+PROBE: NEGATIVE

## 2021-08-24 PROCEDURE — 96372 THER/PROPH/DIAG INJ SC/IM: CPT | Mod: 59,ER

## 2021-08-24 PROCEDURE — U0002 COVID-19 LAB TEST NON-CDC: HCPCS | Mod: ER | Performed by: EMERGENCY MEDICINE

## 2021-08-24 PROCEDURE — 99284 EMERGENCY DEPT VISIT MOD MDM: CPT | Mod: 25,ER

## 2021-08-24 PROCEDURE — 25000003 PHARM REV CODE 250: Mod: ER | Performed by: EMERGENCY MEDICINE

## 2021-08-24 PROCEDURE — 63600175 PHARM REV CODE 636 W HCPCS: Mod: ER | Performed by: EMERGENCY MEDICINE

## 2021-08-24 RX ORDER — IBUPROFEN 400 MG/1
400 TABLET ORAL EVERY 6 HOURS PRN
Qty: 20 TABLET | Refills: 0 | Status: SHIPPED | OUTPATIENT
Start: 2021-08-24

## 2021-08-24 RX ORDER — DEXAMETHASONE SODIUM PHOSPHATE 4 MG/ML
12 INJECTION, SOLUTION INTRA-ARTICULAR; INTRALESIONAL; INTRAMUSCULAR; INTRAVENOUS; SOFT TISSUE
Status: COMPLETED | OUTPATIENT
Start: 2021-08-24 | End: 2021-08-24

## 2021-08-24 RX ORDER — FLUTICASONE PROPIONATE 50 MCG
1 SPRAY, SUSPENSION (ML) NASAL 2 TIMES DAILY PRN
Qty: 15 G | Refills: 0 | Status: SHIPPED | OUTPATIENT
Start: 2021-08-24

## 2021-08-24 RX ORDER — KETOROLAC TROMETHAMINE 30 MG/ML
30 INJECTION, SOLUTION INTRAMUSCULAR; INTRAVENOUS
Status: COMPLETED | OUTPATIENT
Start: 2021-08-24 | End: 2021-08-24

## 2021-08-24 RX ORDER — ACETAMINOPHEN 325 MG/1
650 TABLET ORAL
Status: COMPLETED | OUTPATIENT
Start: 2021-08-24 | End: 2021-08-24

## 2021-08-24 RX ORDER — CETIRIZINE HYDROCHLORIDE 10 MG/1
10 TABLET ORAL DAILY
Qty: 30 TABLET | Refills: 0 | Status: SHIPPED | OUTPATIENT
Start: 2021-08-24 | End: 2022-08-18

## 2021-08-24 RX ADMIN — ACETAMINOPHEN 650 MG: 325 TABLET ORAL at 05:08

## 2021-08-24 RX ADMIN — DEXAMETHASONE SODIUM PHOSPHATE 12 MG: 4 INJECTION INTRA-ARTICULAR; INTRALESIONAL; INTRAMUSCULAR; INTRAVENOUS; SOFT TISSUE at 08:08

## 2021-08-24 RX ADMIN — KETOROLAC TROMETHAMINE 30 MG: 30 INJECTION, SOLUTION INTRAMUSCULAR; INTRAVENOUS at 08:08

## 2022-05-25 ENCOUNTER — OFFICE VISIT (OUTPATIENT)
Dept: FAMILY MEDICINE | Facility: CLINIC | Age: 22
End: 2022-05-25
Payer: COMMERCIAL

## 2022-05-25 VITALS
TEMPERATURE: 98 F | HEART RATE: 62 BPM | BODY MASS INDEX: 34.84 KG/M2 | WEIGHT: 235.25 LBS | HEIGHT: 69 IN | DIASTOLIC BLOOD PRESSURE: 70 MMHG | RESPIRATION RATE: 18 BRPM | OXYGEN SATURATION: 98 % | SYSTOLIC BLOOD PRESSURE: 120 MMHG

## 2022-05-25 DIAGNOSIS — R10.9 ABDOMINAL PAIN, UNSPECIFIED ABDOMINAL LOCATION: Primary | ICD-10-CM

## 2022-05-25 DIAGNOSIS — R04.0 EPISTAXIS: ICD-10-CM

## 2022-05-25 DIAGNOSIS — D17.9 LIPOMA, UNSPECIFIED SITE: ICD-10-CM

## 2022-05-25 PROCEDURE — 3074F PR MOST RECENT SYSTOLIC BLOOD PRESSURE < 130 MM HG: ICD-10-PCS | Mod: CPTII,S$GLB,, | Performed by: FAMILY MEDICINE

## 2022-05-25 PROCEDURE — 1159F PR MEDICATION LIST DOCUMENTED IN MEDICAL RECORD: ICD-10-PCS | Mod: CPTII,S$GLB,, | Performed by: FAMILY MEDICINE

## 2022-05-25 PROCEDURE — 3078F PR MOST RECENT DIASTOLIC BLOOD PRESSURE < 80 MM HG: ICD-10-PCS | Mod: CPTII,S$GLB,, | Performed by: FAMILY MEDICINE

## 2022-05-25 PROCEDURE — 99999 PR PBB SHADOW E&M-EST. PATIENT-LVL V: CPT | Mod: PBBFAC,,, | Performed by: FAMILY MEDICINE

## 2022-05-25 PROCEDURE — 99214 PR OFFICE/OUTPT VISIT, EST, LEVL IV, 30-39 MIN: ICD-10-PCS | Mod: S$GLB,,, | Performed by: FAMILY MEDICINE

## 2022-05-25 PROCEDURE — 3008F PR BODY MASS INDEX (BMI) DOCUMENTED: ICD-10-PCS | Mod: CPTII,S$GLB,, | Performed by: FAMILY MEDICINE

## 2022-05-25 PROCEDURE — 99214 OFFICE O/P EST MOD 30 MIN: CPT | Mod: S$GLB,,, | Performed by: FAMILY MEDICINE

## 2022-05-25 PROCEDURE — 3008F BODY MASS INDEX DOCD: CPT | Mod: CPTII,S$GLB,, | Performed by: FAMILY MEDICINE

## 2022-05-25 PROCEDURE — 3074F SYST BP LT 130 MM HG: CPT | Mod: CPTII,S$GLB,, | Performed by: FAMILY MEDICINE

## 2022-05-25 PROCEDURE — 99999 PR PBB SHADOW E&M-EST. PATIENT-LVL V: ICD-10-PCS | Mod: PBBFAC,,, | Performed by: FAMILY MEDICINE

## 2022-05-25 PROCEDURE — 3078F DIAST BP <80 MM HG: CPT | Mod: CPTII,S$GLB,, | Performed by: FAMILY MEDICINE

## 2022-05-25 PROCEDURE — 1159F MED LIST DOCD IN RCRD: CPT | Mod: CPTII,S$GLB,, | Performed by: FAMILY MEDICINE

## 2022-05-25 RX ORDER — FLUTICASONE PROPIONATE 50 MCG
50 SPRAY, SUSPENSION (ML) NASAL 2 TIMES DAILY PRN
COMMUNITY
Start: 2021-08-24

## 2022-05-25 RX ORDER — CETIRIZINE HYDROCHLORIDE 10 MG/1
1 TABLET ORAL
COMMUNITY
Start: 2021-08-24 | End: 2022-08-24

## 2022-05-25 RX ORDER — AZITHROMYCIN 250 MG/1
TABLET, FILM COATED ORAL
Qty: 6 TABLET | Refills: 0 | Status: SHIPPED | OUTPATIENT
Start: 2022-05-25 | End: 2022-05-30

## 2022-05-25 NOTE — PROGRESS NOTES
Subjective:       Patient ID: Juan Healy is a 21 y.o. male.    Chief Complaint: Back Pain, Rash (Behind right ear), and Headache      Back Pain  Associated symptoms include chest pain and headaches. Pertinent negatives include no weakness.   Rash  Associated symptoms include diarrhea. Pertinent negatives include no rhinorrhea or vomiting.   Headache   Associated symptoms include back pain and neck pain. Pertinent negatives include no hearing loss, rhinorrhea, vomiting or weakness.     20 yo male presents for multiple issues. Endorses back pain, HA, and ab pain.  States the abdominal pain has been worsening recently.  Endorses having occasional diarrhea and constipation.  Has a mass behind his ear which he feels is irritating.  Denies any fever chills. Has nosebleeds and states he was able to have a 'blood vessel' blow out of his nose.    Review of Systems   Constitutional: Positive for unexpected weight change. Negative for activity change.   HENT: Negative.  Negative for hearing loss, rhinorrhea and trouble swallowing.    Eyes: Negative for discharge and visual disturbance.   Respiratory: Positive for chest tightness. Negative for wheezing.    Cardiovascular: Positive for chest pain. Negative for palpitations.   Gastrointestinal: Positive for diarrhea. Negative for blood in stool, constipation and vomiting.   Endocrine: Negative.  Negative for polydipsia and polyuria.   Genitourinary: Negative.  Negative for difficulty urinating, hematuria and urgency.   Musculoskeletal: Positive for back pain and neck pain. Negative for arthralgias and joint swelling.   Neurological: Positive for headaches. Negative for weakness.   Psychiatric/Behavioral: Negative.  Negative for confusion and dysphoric mood.          History reviewed. No pertinent past medical history.  History reviewed. No pertinent surgical history.  History reviewed. No pertinent family history.  Social History     Socioeconomic History    Marital status:  "   Tobacco Use    Smoking status: Former Smoker     Types: Cigarettes    Smokeless tobacco: Never Used   Substance and Sexual Activity    Alcohol use: Yes     Comment: Occasionally     Drug use: No    Sexual activity: Yes     Partners: Female       Current Outpatient Medications:     (Magic mouthwash) 1:1:1 Benadryl 12.5mg/5ml liq, aluminum & magnesium hydroxide-simehticone (Maalox), lidocaine viscous 2%, Swish and spit 10 mLs every 4 (four) hours as needed. for sore throat (Patient not taking: Reported on 5/25/2022), Disp: 360 mL, Rfl: 0    azithromycin (Z-VICTORIANO) 250 MG tablet, Take 2 tablets by mouth on day 1; Take 1 tablet by mouth on days 2-5, Disp: 6 tablet, Rfl: 0    cetirizine (ZYRTEC) 10 MG tablet, Take 1 tablet (10 mg total) by mouth once daily. (Patient not taking: Reported on 5/25/2022), Disp: 30 tablet, Rfl: 0    cetirizine (ZYRTEC) 10 MG tablet, Take 1 tablet by mouth., Disp: , Rfl:     fluticasone propionate (FLONASE) 50 mcg/actuation nasal spray, 1 spray (50 mcg total) by Each Nostril route once daily. (Patient not taking: Reported on 5/25/2022), Disp: 16 g, Rfl: 0    fluticasone propionate (FLONASE) 50 mcg/actuation nasal spray, 1 spray (50 mcg total) by Each Nostril route 2 (two) times daily as needed for Rhinitis. (Patient not taking: Reported on 5/25/2022), Disp: 15 g, Rfl: 0    fluticasone propionate (FLONASE) 50 mcg/actuation nasal spray, 50 mcg by Nasal route 2 (two) times daily as needed., Disp: , Rfl:     ibuprofen (ADVIL,MOTRIN) 400 MG tablet, Take 1 tablet (400 mg total) by mouth every 6 (six) hours as needed for Other. (Patient not taking: Reported on 5/25/2022), Disp: 20 tablet, Rfl: 0   Objective:      Vitals:    05/25/22 0747   BP: 120/70   BP Location: Left arm   Patient Position: Sitting   BP Method: Small (Manual)   Pulse: 62   Resp: 18   Temp: 98.1 °F (36.7 °C)   TempSrc: Oral   SpO2: 98%   Weight: 106.7 kg (235 lb 3.7 oz)   Height: 5' 9" (1.753 m)       Physical " Exam  Constitutional:       General: He is not in acute distress.  HENT:      Head: Normocephalic and atraumatic.   Eyes:      Conjunctiva/sclera: Conjunctivae normal.   Cardiovascular:      Rate and Rhythm: Normal rate and regular rhythm.      Heart sounds: Normal heart sounds. No murmur heard.    No friction rub. No gallop.   Pulmonary:      Effort: Pulmonary effort is normal.      Breath sounds: Normal breath sounds. No wheezing or rales.   Abdominal:      Tenderness: There is abdominal tenderness (on LUQ).   Musculoskeletal:      Cervical back: Neck supple.   Skin:     General: Skin is warm and dry.      Findings: Lesion (multiple lipomas) present.   Neurological:      Mental Status: He is alert and oriented to person, place, and time.   Psychiatric:         Behavior: Behavior normal.         Thought Content: Thought content normal.         Judgment: Judgment normal.            Assessment:       1. Abdominal pain, unspecified abdominal location    2. Lipoma, unspecified site    3. Epistaxis        Plan:       Abdominal pain, unspecified abdominal location  -     azithromycin (Z-VICTORIANO) 250 MG tablet; Take 2 tablets by mouth on day 1; Take 1 tablet by mouth on days 2-5  Dispense: 6 tablet; Refill: 0  -     Ambulatory referral/consult to Gastroenterology; Future; Expected date: 06/01/2022    Lipoma, unspecified site  -     Ambulatory referral/consult to Dermatology; Future; Expected date: 06/01/2022    Epistaxis      Given abx due to sinus issues along w/ GI issues. Referred to gastro. Has most likely a lipoma behind his ear. Placed referral to derm. Pt was advised to f/u w/ his ent for nosebleeds.          Patient note was created using [employer].  Any errors in syntax or even information may not have been identified and edited on initial review prior to signing this note.

## 2022-05-25 NOTE — PROGRESS NOTES
Health Maintenance Due   Topic     Hepatitis C Screening  Consult pcp      Lipid Panel  Consult pcp      HPV Vaccines (1 - Male 2-dose series) Consult pcp      HIV Screening      COVID-19 Vaccine (3 - Booster for Moderna series)

## 2022-05-27 ENCOUNTER — TELEPHONE (OUTPATIENT)
Dept: ADMINISTRATIVE | Facility: HOSPITAL | Age: 22
End: 2022-05-27
Payer: COMMERCIAL

## 2022-08-18 ENCOUNTER — OFFICE VISIT (OUTPATIENT)
Dept: GASTROENTEROLOGY | Facility: CLINIC | Age: 22
End: 2022-08-18
Payer: COMMERCIAL

## 2022-08-18 VITALS
HEART RATE: 84 BPM | DIASTOLIC BLOOD PRESSURE: 77 MMHG | HEIGHT: 68 IN | WEIGHT: 237 LBS | SYSTOLIC BLOOD PRESSURE: 124 MMHG | BODY MASS INDEX: 35.92 KG/M2

## 2022-08-18 DIAGNOSIS — R10.32 LEFT LOWER QUADRANT PAIN: Primary | ICD-10-CM

## 2022-08-18 DIAGNOSIS — R59.1 LYMPHADENOPATHY: ICD-10-CM

## 2022-08-18 DIAGNOSIS — R10.9 ABDOMINAL PAIN, UNSPECIFIED ABDOMINAL LOCATION: ICD-10-CM

## 2022-08-18 PROCEDURE — 99204 PR OFFICE/OUTPT VISIT, NEW, LEVL IV, 45-59 MIN: ICD-10-PCS | Mod: S$GLB,,, | Performed by: INTERNAL MEDICINE

## 2022-08-18 PROCEDURE — 99204 OFFICE O/P NEW MOD 45 MIN: CPT | Mod: S$GLB,,, | Performed by: INTERNAL MEDICINE

## 2022-08-18 PROCEDURE — 1159F MED LIST DOCD IN RCRD: CPT | Mod: CPTII,S$GLB,, | Performed by: INTERNAL MEDICINE

## 2022-08-18 PROCEDURE — 3074F SYST BP LT 130 MM HG: CPT | Mod: CPTII,S$GLB,, | Performed by: INTERNAL MEDICINE

## 2022-08-18 PROCEDURE — 1159F PR MEDICATION LIST DOCUMENTED IN MEDICAL RECORD: ICD-10-PCS | Mod: CPTII,S$GLB,, | Performed by: INTERNAL MEDICINE

## 2022-08-18 PROCEDURE — 3008F BODY MASS INDEX DOCD: CPT | Mod: CPTII,S$GLB,, | Performed by: INTERNAL MEDICINE

## 2022-08-18 PROCEDURE — 3078F DIAST BP <80 MM HG: CPT | Mod: CPTII,S$GLB,, | Performed by: INTERNAL MEDICINE

## 2022-08-18 PROCEDURE — 3008F PR BODY MASS INDEX (BMI) DOCUMENTED: ICD-10-PCS | Mod: CPTII,S$GLB,, | Performed by: INTERNAL MEDICINE

## 2022-08-18 PROCEDURE — 99999 PR PBB SHADOW E&M-EST. PATIENT-LVL IV: CPT | Mod: PBBFAC,,, | Performed by: INTERNAL MEDICINE

## 2022-08-18 PROCEDURE — 99999 PR PBB SHADOW E&M-EST. PATIENT-LVL IV: ICD-10-PCS | Mod: PBBFAC,,, | Performed by: INTERNAL MEDICINE

## 2022-08-18 PROCEDURE — 3078F PR MOST RECENT DIASTOLIC BLOOD PRESSURE < 80 MM HG: ICD-10-PCS | Mod: CPTII,S$GLB,, | Performed by: INTERNAL MEDICINE

## 2022-08-18 PROCEDURE — 3074F PR MOST RECENT SYSTOLIC BLOOD PRESSURE < 130 MM HG: ICD-10-PCS | Mod: CPTII,S$GLB,, | Performed by: INTERNAL MEDICINE

## 2022-08-18 PROCEDURE — 1160F RVW MEDS BY RX/DR IN RCRD: CPT | Mod: CPTII,S$GLB,, | Performed by: INTERNAL MEDICINE

## 2022-08-18 PROCEDURE — 1160F PR REVIEW ALL MEDS BY PRESCRIBER/CLIN PHARMACIST DOCUMENTED: ICD-10-PCS | Mod: CPTII,S$GLB,, | Performed by: INTERNAL MEDICINE

## 2022-08-18 NOTE — Clinical Note
Tao please schedule patient for CT scan abdomen pelvis orders placed  Referral to infectious disease referral placed  Return GI clinic 4 weeks for follow-up video visit

## 2022-08-18 NOTE — PROGRESS NOTES
[facility] Consultation Note    Reason for Consult:  The primary encounter diagnosis was Left lower quadrant pain. Diagnoses of Abdominal pain, unspecified abdominal location and Lymphadenopathy were also pertinent to this visit.    PCP:   Qiana Porter   [address] / [facility] [address]    Referring MD:  Bryan Anderson Md  [address] [place] [address]    Initial History of Present Illness (HPI):  This is a 21 y.o. male here for evaluation of left lower quadrant pain for the last several months no nausea no vomiting no known trauma no change in bowel habits no diarrhea no constipation no blood in his stool no dysphagia no odynophagia no heartburn no fever no chills no shortness of breath no chest pain lab work recently in the last few days look good he has noticed on his scalp a lesion he saw dermatologist at the beginning of this year they thought it was a lesion but the biopsies came back normally seen ENT later this week or next week for what he thinks is a lymph node behind his right auricle he also has a lesion in his subcutaneous tissue in his right bicep tricep area that is been there for quite some time no history of trauma or bug bites or tick bites her cat scratches for sure.    Abdominal pain - as above  Reflux - no  Dysphagia - no   Bowel habits - normal  GI bleeding - none  NSAID usage - none    Interval HPI 08/18/2022:  The patient's last visit with me was on Visit date not found.      ROS:  Constitutional: No fevers, chills, No weight loss  ENT:  No heartburn no dysphagia no odynophagia no hoarseness  CV: No chest pain, no palpitation  Pulm: No cough, No shortness of breath, no wheezing  Ophtho: No vision changes  GI: see HPI  Derm: No rash, no itching  Heme:  As above lymphadenopathy, No easy bruising  MSK: No significant arthritis  : No dysuria, No hematuria  Endo: No hot or cold intolerance  Neuro: No syncope, No seizure, no strokes  Psych: No  "uncontrolled anxiety, No uncontrolled depression    Medical History:  has no past medical history on file.    Surgical History:  has no past surgical history on file.    Family History: family history is not on file..     Social History:  reports that he has quit smoking. His smoking use included cigarettes. He has never used smokeless tobacco. He reports current alcohol use. He reports current drug use. Drug: Marijuana.    Review of patient's allergies indicates:  No Known Allergies    Medication List with Changes/Refills   Current Medications    CETIRIZINE (ZYRTEC) 10 MG TABLET    Take 1 tablet by mouth.    FLUTICASONE PROPIONATE (FLONASE) 50 MCG/ACTUATION NASAL SPRAY    1 spray (50 mcg total) by Each Nostril route 2 (two) times daily as needed for Rhinitis.    FLUTICASONE PROPIONATE (FLONASE) 50 MCG/ACTUATION NASAL SPRAY    50 mcg by Nasal route 2 (two) times daily as needed.    IBUPROFEN (ADVIL,MOTRIN) 400 MG TABLET    Take 1 tablet (400 mg total) by mouth every 6 (six) hours as needed for Other.   Discontinued Medications    (MAGIC MOUTHWASH) 1:1:1 BENADRYL 12.5MG/5ML LIQ, ALUMINUM & MAGNESIUM HYDROXIDE-SIMEHTICONE (MAALOX), LIDOCAINE VISCOUS 2%    Swish and spit 10 mLs every 4 (four) hours as needed. for sore throat    CETIRIZINE (ZYRTEC) 10 MG TABLET    Take 1 tablet (10 mg total) by mouth once daily.    FLUTICASONE PROPIONATE (FLONASE) 50 MCG/ACTUATION NASAL SPRAY    1 spray (50 mcg total) by Each Nostril route once daily.         Objective Findings:    Vital Signs:  /77   Pulse 84   Ht 5' 8" (1.727 m)   Wt 107.5 kg (236 lb 15.9 oz)   BMI 36.03 kg/m²   Body mass index is 36.03 kg/m².    Physical Exam:  General Appearance: Well appearing in no acute distress  Eyes:    No scleral icterus  ENT:  Oral cavity is clear no cervical or supraclavicular lymphadenopathy TMs bilaterally or clear external canals clear right posterior auricle submucosal cyst versus possible lymph node less likely no tragal " tenderness.  No erythematous mucosa no drainage no necrosis.  Lungs: CTA bilaterally, no wheezes, no rhonchi, no rales  Heart:  S1, S2 normal, no murmurs heard  Abdomen:  Non distended, soft, no guarding, no rebound, mild left lower quadrant tenderness, no appreciated ascites, no bruits, no hepatosplenomegaly,  No CVA tenderness, no appreciated hernias, no McBurney point tenderness no Lopez sign  Musculoskeletal:  No major joint deformities  Skin:  Scalp area appears to have sebaceous cyst potentially nontender no erythema no exudate right post auricle submucosal soft tissue swelling not definitive for a lymph node could be a cyst and right arm medial aspect upper arm between bicep and tricep a nontender deep soft tissue lesion.  Neurologic:  Alert and oriented x4  Psychiatric:  Normal speech mentation and affect    Labs:  Lab Results   Component Value Date    WBC 8.69 03/11/2019    HGB 15.3 03/11/2019    HCT 45.6 03/11/2019     03/11/2019    ALT 31 03/11/2019    AST 24 03/11/2019     03/11/2019    K 3.7 03/11/2019     03/11/2019    CREATININE 0.8 03/11/2019    BUN 11 03/11/2019    CO2 26 03/11/2019             Medical Decision Making:  Labs reviewed  CT scan talk  Referral to ID        Assessment:  1. Left lower quadrant pain    2. Abdominal pain, unspecified abdominal location    3. Lymphadenopathy         Recommendations:  1. CT scan abdomen pelvis rule out diverticulitis  2. Patient has he has up coming ENT clinic appointment for subcutaneous tissue swelling posterior right auricle  3. Possible lymph node swelling versus subcutaneous lesion right upper forearm between biceps and triceps along with scalp skin lesion will refer to infectious disease for further evaluation.  4. Return GI clinic telemedicine video visit 4 weeks for follow-up      Follow up in about 4 weeks (around 9/15/2022).      Order summary:  Orders Placed This Encounter    CT Abdomen Pelvis W Wo Contrast    Ambulatory  referral/consult to Infectious Disease         Thank you so much for allowing me to participate in the care of Juan Niraj Gilbert MD

## 2022-09-09 ENCOUNTER — PATIENT MESSAGE (OUTPATIENT)
Dept: OTOLARYNGOLOGY | Facility: CLINIC | Age: 22
End: 2022-09-09
Payer: COMMERCIAL

## 2022-11-04 ENCOUNTER — TELEPHONE (OUTPATIENT)
Dept: OTOLARYNGOLOGY | Facility: CLINIC | Age: 22
End: 2022-11-04
Payer: COMMERCIAL

## 2022-11-04 ENCOUNTER — PATIENT MESSAGE (OUTPATIENT)
Dept: OTOLARYNGOLOGY | Facility: CLINIC | Age: 22
End: 2022-11-04
Payer: COMMERCIAL

## 2022-11-04 NOTE — TELEPHONE ENCOUNTER
Called and LM advising that appt on 11-7 @ 8:15am will need to be moved to 1:30pm because Dr. Shipley will be in emergency surgery in the AM.

## 2024-12-26 ENCOUNTER — HOSPITAL ENCOUNTER (EMERGENCY)
Facility: HOSPITAL | Age: 24
Discharge: HOME | End: 2024-12-26
Attending: STUDENT IN AN ORGANIZED HEALTH CARE EDUCATION/TRAINING PROGRAM
Payer: COMMERCIAL

## 2024-12-26 VITALS
HEART RATE: 101 BPM | TEMPERATURE: 100 F | BODY MASS INDEX: 31.83 KG/M2 | WEIGHT: 210 LBS | SYSTOLIC BLOOD PRESSURE: 139 MMHG | OXYGEN SATURATION: 98 % | HEIGHT: 68 IN | RESPIRATION RATE: 20 BRPM | DIASTOLIC BLOOD PRESSURE: 65 MMHG

## 2024-12-26 DIAGNOSIS — R53.1 WEAKNESS: ICD-10-CM

## 2024-12-26 DIAGNOSIS — J10.1 INFLUENZA A: Primary | ICD-10-CM

## 2024-12-26 LAB
CTP QC/QA: YES
CTP QC/QA: YES
OHS QRS DURATION: 78 MS
OHS QTC CALCULATION: 420 MS
POC MOLECULAR INFLUENZA A AGN: POSITIVE
POC MOLECULAR INFLUENZA B AGN: NEGATIVE
SARS-COV-2 RDRP RESP QL NAA+PROBE: NEGATIVE

## 2024-12-26 PROCEDURE — 99284 EMERGENCY DEPT VISIT MOD MDM: CPT | Mod: 25

## 2024-12-26 PROCEDURE — 93010 ELECTROCARDIOGRAM REPORT: CPT | Mod: ,,, | Performed by: INTERNAL MEDICINE

## 2024-12-26 PROCEDURE — 93005 ELECTROCARDIOGRAM TRACING: CPT

## 2024-12-26 PROCEDURE — 25000003 PHARM REV CODE 250

## 2024-12-26 PROCEDURE — 87502 INFLUENZA DNA AMP PROBE: CPT

## 2024-12-26 PROCEDURE — 87635 SARS-COV-2 COVID-19 AMP PRB: CPT | Performed by: EMERGENCY MEDICINE

## 2024-12-26 RX ORDER — ACETAMINOPHEN 500 MG
1000 TABLET ORAL
Status: COMPLETED | OUTPATIENT
Start: 2024-12-26 | End: 2024-12-26

## 2024-12-26 RX ORDER — BENZONATATE 200 MG/1
200 CAPSULE ORAL 3 TIMES DAILY PRN
Qty: 30 CAPSULE | Refills: 0 | Status: SHIPPED | OUTPATIENT
Start: 2024-12-26 | End: 2025-01-05

## 2024-12-26 RX ORDER — ACETAMINOPHEN 500 MG
500 TABLET ORAL EVERY 6 HOURS PRN
Qty: 30 TABLET | Refills: 0 | Status: SHIPPED | OUTPATIENT
Start: 2024-12-26

## 2024-12-26 RX ORDER — OXYMETAZOLINE HCL 0.05 %
1 SPRAY, NON-AEROSOL (ML) NASAL 2 TIMES DAILY
Qty: 15 ML | Refills: 0 | Status: SHIPPED | OUTPATIENT
Start: 2024-12-26 | End: 2024-12-29

## 2024-12-26 RX ORDER — PROMETHAZINE HYDROCHLORIDE AND DEXTROMETHORPHAN HYDROBROMIDE 6.25; 15 MG/5ML; MG/5ML
5 SYRUP ORAL EVERY 4 HOURS PRN
Qty: 118 ML | Refills: 0 | Status: SHIPPED | OUTPATIENT
Start: 2024-12-26 | End: 2025-01-05

## 2024-12-26 RX ADMIN — ACETAMINOPHEN 1000 MG: 500 TABLET ORAL at 12:12

## 2024-12-26 NOTE — DISCHARGE INSTRUCTIONS
Thank you for coming to our Emergency Department today. It is important to remember that some problems or medical conditions are difficult to diagnose and may not be found or addressed during your Emergency Department visit.  These conditions often start with non-specific symptoms and can only be diagnosed on follow up visits with your primary care physician or specialist when the symptoms continue or change. Please remember that all medical conditions can change, and we cannot predict how you will be feeling tomorrow or the next day. Return to the ER with any questions/concerns, new/concerning symptoms, worsening or failure to improve.       Be sure to follow up with your primary care doctor and review all labs/imaging/tests that were performed during your ER visit with them. It is very common for us to identify non-emergent incidental findings which must be followed up with your primary care physician.  Some labs/imaging/tests may be outside of the normal range, and require non-emergent follow-up and/or further investigation/treatment/procedures/testing to help diagnose/exclude/prevent complications or other potentially serious medical conditions. Some abnormalities may not have been discussed or addressed during your ER visit.     An ER visit does not replace a primary care visit, and many screening tests or follow-up tests cannot be ordered by an ER doctor or performed by the ER. Some tests may even require pre-approval.    If you do not have a primary care doctor, you may contact the one listed on your discharge paperwork or you may also call the [facility] Appointment Desk at [phone] , or [address] [place] [address] at  [phone] to schedule an appointment, or establish care with a primary care doctor or even a specialist and to obtain information about local resources. It is important to your health that you have a primary care doctor.    Please take all medications as directed. We have done our best to select  a medication for you that will treat your condition however, all medications may potentially have side-effects and it is impossible to predict which medications may give you side-effects or what those side-effects (if any) those medications may give you.  If you feel that you are having a negative effect or side-effect of any medication you should stop taking those medications immediately and seek medical attention. If you feel that you are having a life-threatening reaction call 911.        Do not drive, swim, climb to height, take a bath, operate heavy machinery, drink alcohol or take potentially sedating medications, sign any legal documents or make any important decisions for 24 hours if you have received any pain medications, sedatives or mood altering drugs during your ER visit or within 24 hours of taking them if they have been prescribed to you.     You can find additional resources for Dentists, hearing aids, durable medical equipment, low cost pharmacies and other resources at [email or web address]\

## 2024-12-26 NOTE — ED PROVIDER NOTES
Encounter Date: 12/26/2024       History     Chief Complaint   Patient presents with    Cough     Pt c/o cough, fatigue and headache x2 days.     Patient is a 24-year-old male presenting for evaluation of cough, fatigue and headaches.  Patient states his symptoms started yesterday.  Denies known sick contacts.  Reports suggest a fever, runny nose, body aches, and diarrhea yesterday.  Denies chest pain, shortness breath, or vomiting.  Has been taking TheraFlu with little relief.  Patient states that he has been feeling weak.  Does note decrease in appetite.        Review of patient's allergies indicates:  No Known Allergies  History reviewed. No pertinent past medical history.  History reviewed. No pertinent surgical history.  Family History   Problem Relation Name Age of Onset    Colon cancer Neg Hx      Colon polyps Neg Hx      Esophageal cancer Neg Hx      Celiac disease Neg Hx      Cirrhosis Neg Hx      Inflammatory bowel disease Neg Hx      Liver cancer Neg Hx      Liver disease Neg Hx      Rectal cancer Neg Hx      Stomach cancer Neg Hx      Ulcerative colitis Neg Hx      Crohn's disease Neg Hx      Pancreatitis Neg Hx      Pancreatic cancer Neg Hx      Uterine cancer Neg Hx      Ovarian cancer Neg Hx      Bladder Cancer Neg Hx      Kidney cancer Neg Hx       Social History     Tobacco Use    Smoking status: Former     Types: Cigarettes    Smokeless tobacco: Never   Substance Use Topics    Alcohol use: Yes     Comment: Occasionally     Drug use: Yes     Types: Marijuana     Comment: 4 times a week      Review of Systems   Constitutional:  Positive for appetite change, chills and fever.   HENT:  Positive for rhinorrhea.    Respiratory:  Negative for cough and shortness of breath.    Cardiovascular:  Negative for chest pain.   Gastrointestinal:  Positive for diarrhea. Negative for constipation, nausea and vomiting.   Musculoskeletal:  Positive for myalgias.   Skin:  Negative for color change.   Neurological:   Positive for weakness and headaches. Negative for dizziness and light-headedness.   Psychiatric/Behavioral:  Negative for confusion.        Physical Exam     Initial Vitals [12/26/24 1038]   BP Pulse Resp Temp SpO2   139/65 101 20 99.5 °F (37.5 °C) 98 %      MAP       --         Physical Exam    Constitutional: He appears well-developed and well-nourished. He is not diaphoretic. No distress.   HENT:   Head: Normocephalic and atraumatic.   Nose: Nose normal.   Eyes: Conjunctivae and EOM are normal. Right eye exhibits no discharge. Left eye exhibits no discharge.   Neck:   Normal range of motion.  Cardiovascular:  Normal rate and regular rhythm.           Pulmonary/Chest: Breath sounds normal. No respiratory distress. He has no wheezes. He has no rhonchi. He has no rales.   Abdominal: Abdomen is soft. There is no abdominal tenderness.   Musculoskeletal:         General: Normal range of motion.      Cervical back: Normal range of motion.     Neurological: He is alert and oriented to person, place, and time.   Skin: Skin is warm and dry. Capillary refill takes less than 2 seconds.   Psychiatric: He has a normal mood and affect.         ED Course   Procedures  Labs Reviewed   POCT INFLUENZA A/B MOLECULAR - Abnormal       Result Value    POC Molecular Influenza A Ag Positive (*)     POC Molecular Influenza B Ag Negative       Acceptable Yes     SARS-COV-2 RDRP GENE    POC Rapid COVID Negative       Acceptable Yes            Imaging Results    None          Medications   acetaminophen tablet 1,000 mg (1,000 mg Oral Given 12/26/24 1235)     Medical Decision Making  Patient is a 24-year-old male presenting for evaluation of cough, fatigue and headaches.     Differential includes but not limited to COVID, flu, viral URI, pneumonia however less likely due to lack of shortness a breath and chest pain.    Patient is alert and afebrile.  Patient nontoxic appearing, not in distress.  On physical exam  lungs are clear to auscultation.  Normal heart rate and rhythm.  No abdominal tenderness.  Patient ambulating without difficulty.    Influenza a is positive.  EKG sinus tachycardia an awake beats per minute, with nonspecific T-wave abnormality.  Patient given Tylenol for pain.  Discussed with patient symptomatic treatment.  Recommended patient taking Tylenol or ibuprofen as needed for pain and fever.  Return precautions given for new or worsening symptoms.  Recommended patient follow up with PCP in 2 days.  Patient expresses understanding return precautions and follow up plan.    Risk  OTC drugs.  Prescription drug management.                                      Clinical Impression:  Final diagnoses:  [R53.1] Weakness  [J10.1] Influenza A (Primary)          ED Disposition Condition    Discharge Stable          ED Prescriptions       Medication Sig Dispense Start Date End Date Auth. Provider    benzocaine-menthoL 15-3.6 mg Lozg 1 lozenge by Mucous Membrane route 4 (four) times daily as needed (sore throat). 16 lozenge 12/26/2024 -- Joellen Armenta PA-C    oxymetazoline (AFRIN) 0.05 % nasal spray 1 spray by Nasal route 2 (two) times daily. Do not take medication for longer than 3 days. for 3 days 15 mL 12/26/2024 12/29/2024 Joellen Armenta PA-C    benzonatate (TESSALON) 200 MG capsule Take 1 capsule (200 mg total) by mouth 3 (three) times daily as needed for Cough. 30 capsule 12/26/2024 1/5/2025 Joellen Armenta PA-C    promethazine-dextromethorphan (PROMETHAZINE-DM) 6.25-15 mg/5 mL Syrp Take 5 mLs by mouth every 4 (four) hours as needed. 118 mL 12/26/2024 1/5/2025 Joellen Armenta PA-C    acetaminophen (TYLENOL) 500 MG tablet Take 1 tablet (500 mg total) by mouth every 6 (six) hours as needed for Pain. 30 tablet 12/26/2024 -- Joellen Armenta PA-C          Follow-up Information       Follow up With Specialties Details Why Contact Info    Qiana Porter MD Pediatrics In 2 days For follow up [address]  [facility]  [address]  [place] [address]  [phone]      [facility] - Emergency Dept Emergency Medicine Go to  If new symptoms develop or symptoms worsen [address] [facility] [place] [address] [phone]             Joellen Armenta PAAiramC  12/26/24 2223